# Patient Record
Sex: MALE | NOT HISPANIC OR LATINO | URBAN - METROPOLITAN AREA
[De-identification: names, ages, dates, MRNs, and addresses within clinical notes are randomized per-mention and may not be internally consistent; named-entity substitution may affect disease eponyms.]

---

## 2023-07-14 ENCOUNTER — APPOINTMENT (OUTPATIENT)
Dept: CARDIOLOGY | Facility: MEDICAL CENTER | Age: 48
End: 2023-07-14
Attending: HOSPITALIST
Payer: COMMERCIAL

## 2023-07-14 ENCOUNTER — HOSPITAL ENCOUNTER (OUTPATIENT)
Facility: MEDICAL CENTER | Age: 48
End: 2023-07-16
Attending: EMERGENCY MEDICINE | Admitting: HOSPITALIST
Payer: COMMERCIAL

## 2023-07-14 ENCOUNTER — APPOINTMENT (OUTPATIENT)
Dept: RADIOLOGY | Facility: MEDICAL CENTER | Age: 48
End: 2023-07-14
Attending: EMERGENCY MEDICINE
Payer: COMMERCIAL

## 2023-07-14 DIAGNOSIS — R07.89 ATYPICAL CHEST PAIN: ICD-10-CM

## 2023-07-14 PROBLEM — I16.0 HYPERTENSIVE URGENCY: Status: ACTIVE | Noted: 2023-07-14

## 2023-07-14 PROBLEM — I49.9 ARRHYTHMIA: Status: ACTIVE | Noted: 2023-07-14

## 2023-07-14 PROBLEM — E78.2 MIXED HYPERLIPIDEMIA: Status: ACTIVE | Noted: 2023-07-14

## 2023-07-14 PROBLEM — I50.22 CHRONIC SYSTOLIC HEART FAILURE (HCC): Status: ACTIVE | Noted: 2023-07-14

## 2023-07-14 PROBLEM — Z86.79 HISTORY OF VENTRICULAR TACHYCARDIA: Status: ACTIVE | Noted: 2023-07-14

## 2023-07-14 PROBLEM — R07.9 CHEST PAIN: Status: ACTIVE | Noted: 2023-07-14

## 2023-07-14 LAB
ALBUMIN SERPL BCP-MCNC: 3.6 G/DL (ref 3.2–4.9)
ALBUMIN/GLOB SERPL: 2 G/DL
ALP SERPL-CCNC: 99 U/L (ref 30–99)
ALT SERPL-CCNC: 15 U/L (ref 2–50)
ANION GAP SERPL CALC-SCNC: 6 MMOL/L (ref 7–16)
AST SERPL-CCNC: 12 U/L (ref 12–45)
BASOPHILS # BLD AUTO: 0.5 % (ref 0–1.8)
BASOPHILS # BLD: 0.03 K/UL (ref 0–0.12)
BILIRUB SERPL-MCNC: 0.6 MG/DL (ref 0.1–1.5)
BUN SERPL-MCNC: 14 MG/DL (ref 8–22)
CALCIUM ALBUM COR SERPL-MCNC: 7.9 MG/DL (ref 8.5–10.5)
CALCIUM SERPL-MCNC: 7.6 MG/DL (ref 8.5–10.5)
CHLORIDE SERPL-SCNC: 105 MMOL/L (ref 96–112)
CO2 SERPL-SCNC: 26 MMOL/L (ref 20–33)
CREAT SERPL-MCNC: 1.01 MG/DL (ref 0.5–1.4)
EKG IMPRESSION: NORMAL
EOSINOPHIL # BLD AUTO: 0.11 K/UL (ref 0–0.51)
EOSINOPHIL NFR BLD: 2 % (ref 0–6.9)
ERYTHROCYTE [DISTWIDTH] IN BLOOD BY AUTOMATED COUNT: 43.8 FL (ref 35.9–50)
FLUAV RNA SPEC QL NAA+PROBE: NEGATIVE
FLUBV RNA SPEC QL NAA+PROBE: NEGATIVE
GFR SERPLBLD CREATININE-BSD FMLA CKD-EPI: 91 ML/MIN/1.73 M 2
GLOBULIN SER CALC-MCNC: 1.8 G/DL (ref 1.9–3.5)
GLUCOSE SERPL-MCNC: 104 MG/DL (ref 65–99)
HCT VFR BLD AUTO: 39.1 % (ref 42–52)
HGB BLD-MCNC: 13.3 G/DL (ref 14–18)
IMM GRANULOCYTES # BLD AUTO: 0.02 K/UL (ref 0–0.11)
IMM GRANULOCYTES NFR BLD AUTO: 0.4 % (ref 0–0.9)
LV EJECT FRACT  99904: 40
LV EJECT FRACT MOD 2C 99903: 39.29
LV EJECT FRACT MOD 4C 99902: 39.42
LV EJECT FRACT MOD BP 99901: 40.44
LYMPHOCYTES # BLD AUTO: 0.92 K/UL (ref 1–4.8)
LYMPHOCYTES NFR BLD: 16.7 % (ref 22–41)
MCH RBC QN AUTO: 31.4 PG (ref 27–33)
MCHC RBC AUTO-ENTMCNC: 34 G/DL (ref 32.3–36.5)
MCV RBC AUTO: 92.4 FL (ref 81.4–97.8)
MONOCYTES # BLD AUTO: 0.58 K/UL (ref 0–0.85)
MONOCYTES NFR BLD AUTO: 10.5 % (ref 0–13.4)
NEUTROPHILS # BLD AUTO: 3.84 K/UL (ref 1.82–7.42)
NEUTROPHILS NFR BLD: 69.9 % (ref 44–72)
NRBC # BLD AUTO: 0 K/UL
NRBC BLD-RTO: 0 /100 WBC (ref 0–0.2)
NT-PROBNP SERPL IA-MCNC: 568 PG/ML (ref 0–125)
PLATELET # BLD AUTO: 106 K/UL (ref 164–446)
PMV BLD AUTO: 11.3 FL (ref 9–12.9)
POTASSIUM SERPL-SCNC: 3.7 MMOL/L (ref 3.6–5.5)
PROT SERPL-MCNC: 5.4 G/DL (ref 6–8.2)
RBC # BLD AUTO: 4.23 M/UL (ref 4.7–6.1)
RSV RNA SPEC QL NAA+PROBE: NEGATIVE
SARS-COV-2 RNA RESP QL NAA+PROBE: NOTDETECTED
SODIUM SERPL-SCNC: 137 MMOL/L (ref 135–145)
SPECIMEN SOURCE: NORMAL
TROPONIN T SERPL-MCNC: 9 NG/L (ref 6–19)
WBC # BLD AUTO: 5.5 K/UL (ref 4.8–10.8)

## 2023-07-14 PROCEDURE — 71045 X-RAY EXAM CHEST 1 VIEW: CPT

## 2023-07-14 PROCEDURE — 93306 TTE W/DOPPLER COMPLETE: CPT | Mod: 26 | Performed by: INTERNAL MEDICINE

## 2023-07-14 PROCEDURE — 36415 COLL VENOUS BLD VENIPUNCTURE: CPT

## 2023-07-14 PROCEDURE — G0378 HOSPITAL OBSERVATION PER HR: HCPCS

## 2023-07-14 PROCEDURE — A9270 NON-COVERED ITEM OR SERVICE: HCPCS | Performed by: NURSE PRACTITIONER

## 2023-07-14 PROCEDURE — 93005 ELECTROCARDIOGRAM TRACING: CPT

## 2023-07-14 PROCEDURE — 85025 COMPLETE CBC W/AUTO DIFF WBC: CPT

## 2023-07-14 PROCEDURE — 93005 ELECTROCARDIOGRAM TRACING: CPT | Performed by: EMERGENCY MEDICINE

## 2023-07-14 PROCEDURE — A9270 NON-COVERED ITEM OR SERVICE: HCPCS | Performed by: HOSPITALIST

## 2023-07-14 PROCEDURE — 99223 1ST HOSP IP/OBS HIGH 75: CPT | Performed by: HOSPITALIST

## 2023-07-14 PROCEDURE — 700111 HCHG RX REV CODE 636 W/ 250 OVERRIDE (IP): Performed by: HOSPITALIST

## 2023-07-14 PROCEDURE — 700102 HCHG RX REV CODE 250 W/ 637 OVERRIDE(OP): Performed by: NURSE PRACTITIONER

## 2023-07-14 PROCEDURE — 0241U HCHG SARS-COV-2 COVID-19 NFCT DS RESP RNA 4 TRGT MIC: CPT

## 2023-07-14 PROCEDURE — 80053 COMPREHEN METABOLIC PANEL: CPT

## 2023-07-14 PROCEDURE — 96375 TX/PRO/DX INJ NEW DRUG ADDON: CPT

## 2023-07-14 PROCEDURE — 99285 EMERGENCY DEPT VISIT HI MDM: CPT

## 2023-07-14 PROCEDURE — 93306 TTE W/DOPPLER COMPLETE: CPT

## 2023-07-14 PROCEDURE — C9803 HOPD COVID-19 SPEC COLLECT: HCPCS | Performed by: EMERGENCY MEDICINE

## 2023-07-14 PROCEDURE — 83880 ASSAY OF NATRIURETIC PEPTIDE: CPT

## 2023-07-14 PROCEDURE — 84484 ASSAY OF TROPONIN QUANT: CPT

## 2023-07-14 PROCEDURE — 700102 HCHG RX REV CODE 250 W/ 637 OVERRIDE(OP): Performed by: HOSPITALIST

## 2023-07-14 RX ORDER — CARVEDILOL 6.25 MG/1
6.25 TABLET ORAL 2 TIMES DAILY WITH MEALS
Status: DISCONTINUED | OUTPATIENT
Start: 2023-07-14 | End: 2023-07-16 | Stop reason: HOSPADM

## 2023-07-14 RX ORDER — ENOXAPARIN SODIUM 100 MG/ML
40 INJECTION SUBCUTANEOUS DAILY
Status: DISCONTINUED | OUTPATIENT
Start: 2023-07-14 | End: 2023-07-16 | Stop reason: HOSPADM

## 2023-07-14 RX ORDER — LISINOPRIL 20 MG/1
20 TABLET ORAL DAILY
COMMUNITY

## 2023-07-14 RX ORDER — MEXILETINE HYDROCHLORIDE 150 MG/1
150 CAPSULE ORAL 3 TIMES DAILY
COMMUNITY

## 2023-07-14 RX ORDER — ACETAMINOPHEN 500 MG
1000 TABLET ORAL EVERY 6 HOURS PRN
Status: DISCONTINUED | OUTPATIENT
Start: 2023-07-19 | End: 2023-07-16

## 2023-07-14 RX ORDER — LISINOPRIL 20 MG/1
20 TABLET ORAL ONCE
Status: DISCONTINUED | OUTPATIENT
Start: 2023-07-14 | End: 2023-07-15

## 2023-07-14 RX ORDER — ATORVASTATIN CALCIUM 20 MG/1
20 TABLET, FILM COATED ORAL
Status: DISCONTINUED | OUTPATIENT
Start: 2023-07-14 | End: 2023-07-16 | Stop reason: HOSPADM

## 2023-07-14 RX ORDER — LACTULOSE 20 G/30ML
30 SOLUTION ORAL EVERY EVENING
Status: DISCONTINUED | OUTPATIENT
Start: 2023-07-15 | End: 2023-07-16 | Stop reason: HOSPADM

## 2023-07-14 RX ORDER — HYDRALAZINE HYDROCHLORIDE 20 MG/ML
20 INJECTION INTRAMUSCULAR; INTRAVENOUS EVERY 6 HOURS PRN
Status: DISCONTINUED | OUTPATIENT
Start: 2023-07-14 | End: 2023-07-16 | Stop reason: HOSPADM

## 2023-07-14 RX ORDER — OXYCODONE HYDROCHLORIDE 10 MG/1
10 TABLET ORAL
Status: DISCONTINUED | OUTPATIENT
Start: 2023-07-14 | End: 2023-07-15

## 2023-07-14 RX ORDER — SPIRONOLACTONE 25 MG/1
25 TABLET ORAL DAILY
COMMUNITY

## 2023-07-14 RX ORDER — AMIODARONE HYDROCHLORIDE 200 MG/1
200 TABLET ORAL 2 TIMES DAILY
Status: DISCONTINUED | OUTPATIENT
Start: 2023-07-14 | End: 2023-07-16 | Stop reason: HOSPADM

## 2023-07-14 RX ORDER — MEXILETINE HYDROCHLORIDE 150 MG/1
150 CAPSULE ORAL EVERY 8 HOURS
Status: DISCONTINUED | OUTPATIENT
Start: 2023-07-14 | End: 2023-07-16 | Stop reason: HOSPADM

## 2023-07-14 RX ORDER — LISINOPRIL 20 MG/1
20 TABLET ORAL DAILY
Status: DISCONTINUED | OUTPATIENT
Start: 2023-07-15 | End: 2023-07-14

## 2023-07-14 RX ORDER — AMIODARONE HYDROCHLORIDE 200 MG/1
200 TABLET ORAL 2 TIMES DAILY
COMMUNITY

## 2023-07-14 RX ORDER — TAMSULOSIN HYDROCHLORIDE 0.4 MG/1
0.4 CAPSULE ORAL EVERY EVENING
Status: DISCONTINUED | OUTPATIENT
Start: 2023-07-14 | End: 2023-07-16 | Stop reason: HOSPADM

## 2023-07-14 RX ORDER — CALCIUM POLYCARBOPHIL 625 MG 625 MG/1
1250 TABLET ORAL DAILY
COMMUNITY

## 2023-07-14 RX ORDER — BISACODYL 10 MG
10 SUPPOSITORY, RECTAL RECTAL
Status: DISCONTINUED | OUTPATIENT
Start: 2023-07-14 | End: 2023-07-16 | Stop reason: HOSPADM

## 2023-07-14 RX ORDER — POLYETHYLENE GLYCOL 3350 17 G/17G
1 POWDER, FOR SOLUTION ORAL
Status: DISCONTINUED | OUTPATIENT
Start: 2023-07-14 | End: 2023-07-16 | Stop reason: HOSPADM

## 2023-07-14 RX ORDER — ATORVASTATIN CALCIUM 20 MG/1
20 TABLET, FILM COATED ORAL
COMMUNITY

## 2023-07-14 RX ORDER — LACTULOSE 10 G/15ML
20 SOLUTION ORAL 2 TIMES DAILY
COMMUNITY

## 2023-07-14 RX ORDER — TAMSULOSIN HYDROCHLORIDE 0.4 MG/1
0.4 CAPSULE ORAL EVERY EVENING
COMMUNITY

## 2023-07-14 RX ORDER — CARVEDILOL 6.25 MG/1
6.25 TABLET ORAL 2 TIMES DAILY
COMMUNITY

## 2023-07-14 RX ORDER — NITROGLYCERIN 0.4 MG/1
0.4 TABLET SUBLINGUAL
COMMUNITY

## 2023-07-14 RX ORDER — ACETAMINOPHEN 500 MG
1000 TABLET ORAL EVERY 6 HOURS
Status: DISCONTINUED | OUTPATIENT
Start: 2023-07-14 | End: 2023-07-16

## 2023-07-14 RX ORDER — IBUPROFEN 800 MG/1
800 TABLET ORAL 3 TIMES DAILY PRN
Status: DISCONTINUED | OUTPATIENT
Start: 2023-07-19 | End: 2023-07-14

## 2023-07-14 RX ORDER — FUROSEMIDE 20 MG/1
20 TABLET ORAL
COMMUNITY

## 2023-07-14 RX ORDER — ASPIRIN 81 MG/1
81 TABLET ORAL DAILY
COMMUNITY

## 2023-07-14 RX ORDER — LACTULOSE 20 G/30ML
30 SOLUTION ORAL 2 TIMES DAILY
Status: DISCONTINUED | OUTPATIENT
Start: 2023-07-14 | End: 2023-07-14

## 2023-07-14 RX ORDER — MORPHINE SULFATE 4 MG/ML
4 INJECTION INTRAVENOUS
Status: DISCONTINUED | OUTPATIENT
Start: 2023-07-14 | End: 2023-07-15

## 2023-07-14 RX ORDER — LISINOPRIL 20 MG/1
40 TABLET ORAL DAILY
Status: DISCONTINUED | OUTPATIENT
Start: 2023-07-15 | End: 2023-07-15

## 2023-07-14 RX ORDER — OXYCODONE HYDROCHLORIDE 5 MG/1
5 TABLET ORAL
Status: DISCONTINUED | OUTPATIENT
Start: 2023-07-14 | End: 2023-07-15

## 2023-07-14 RX ORDER — TRIAMCINOLONE ACETONIDE 55 UG/1
2 SPRAY, METERED NASAL DAILY
COMMUNITY

## 2023-07-14 RX ORDER — IBUPROFEN 800 MG/1
800 TABLET ORAL 3 TIMES DAILY
Status: DISCONTINUED | OUTPATIENT
Start: 2023-07-14 | End: 2023-07-16

## 2023-07-14 RX ORDER — SPIRONOLACTONE 25 MG/1
25 TABLET ORAL DAILY
Status: DISCONTINUED | OUTPATIENT
Start: 2023-07-15 | End: 2023-07-15

## 2023-07-14 RX ORDER — AMOXICILLIN 250 MG
2 CAPSULE ORAL 2 TIMES DAILY
Status: DISCONTINUED | OUTPATIENT
Start: 2023-07-14 | End: 2023-07-16 | Stop reason: HOSPADM

## 2023-07-14 RX ADMIN — AMIODARONE HYDROCHLORIDE 200 MG: 200 TABLET ORAL at 17:04

## 2023-07-14 RX ADMIN — MEXILETINE HYDROCHLORIDE 150 MG: 150 CAPSULE ORAL at 17:04

## 2023-07-14 RX ADMIN — CARVEDILOL 6.25 MG: 6.25 TABLET, FILM COATED ORAL at 17:04

## 2023-07-14 RX ADMIN — OXYCODONE HYDROCHLORIDE 10 MG: 10 TABLET ORAL at 20:17

## 2023-07-14 RX ADMIN — ACETAMINOPHEN 1000 MG: 500 TABLET, FILM COATED ORAL at 20:18

## 2023-07-14 RX ADMIN — TAMSULOSIN HYDROCHLORIDE 0.4 MG: 0.4 CAPSULE ORAL at 17:04

## 2023-07-14 RX ADMIN — HYDRALAZINE HYDROCHLORIDE 20 MG: 20 INJECTION, SOLUTION INTRAMUSCULAR; INTRAVENOUS at 20:14

## 2023-07-14 RX ADMIN — IBUPROFEN 800 MG: 800 TABLET, FILM COATED ORAL at 20:16

## 2023-07-14 RX ADMIN — ATORVASTATIN CALCIUM 20 MG: 20 TABLET, FILM COATED ORAL at 20:17

## 2023-07-14 ASSESSMENT — LIFESTYLE VARIABLES
TOTAL SCORE: 0
ON A TYPICAL DAY WHEN YOU DRINK ALCOHOL HOW MANY DRINKS DO YOU HAVE: 0
TOTAL SCORE: 0
AVERAGE NUMBER OF DAYS PER WEEK YOU HAVE A DRINK CONTAINING ALCOHOL: 0
CONSUMPTION TOTAL: NEGATIVE
EVER HAD A DRINK FIRST THING IN THE MORNING TO STEADY YOUR NERVES TO GET RID OF A HANGOVER: NO
HAVE PEOPLE ANNOYED YOU BY CRITICIZING YOUR DRINKING: NO
EVER FELT BAD OR GUILTY ABOUT YOUR DRINKING: NO
DOES PATIENT WANT TO STOP DRINKING: NO
ALCOHOL_USE: NO
TOTAL SCORE: 0
HOW MANY TIMES IN THE PAST YEAR HAVE YOU HAD 5 OR MORE DRINKS IN A DAY: 0
HAVE YOU EVER FELT YOU SHOULD CUT DOWN ON YOUR DRINKING: NO

## 2023-07-14 ASSESSMENT — PATIENT HEALTH QUESTIONNAIRE - PHQ9
SUM OF ALL RESPONSES TO PHQ9 QUESTIONS 1 AND 2: 0
2. FEELING DOWN, DEPRESSED, IRRITABLE, OR HOPELESS: NOT AT ALL
1. LITTLE INTEREST OR PLEASURE IN DOING THINGS: NOT AT ALL

## 2023-07-14 ASSESSMENT — ENCOUNTER SYMPTOMS
CHILLS: 0
RESPIRATORY NEGATIVE: 1
WEIGHT LOSS: 0
ABDOMINAL PAIN: 0
NAUSEA: 0
HEARTBURN: 0
MUSCULOSKELETAL NEGATIVE: 1
VOMITING: 0
NEUROLOGICAL NEGATIVE: 1
FEVER: 0
EYES NEGATIVE: 1
PSYCHIATRIC NEGATIVE: 1

## 2023-07-14 ASSESSMENT — PAIN DESCRIPTION - PAIN TYPE: TYPE: ACUTE PAIN

## 2023-07-14 ASSESSMENT — FIBROSIS 4 INDEX: FIB4 SCORE: 1.43

## 2023-07-14 NOTE — ED NOTES
Pharmacy Medication Reconciliation      ~Medication reconciliation updated and complete per med list from [facility]

## 2023-07-14 NOTE — ED PROVIDER NOTES
ED Provider Note    CHIEF COMPLAINT  Chief Complaint   Patient presents with    Chest Pain     Pt states he had a sudden onset of crushing and shooting chest pain that lasted approx 5 seconds. Pt states it is substernal, and has happened 2x times before followed by his defibrillator going off. Today the defibrillator did not go off.        EXTERNAL RECORDS REVIEWED  Records from [facility]    HPI/ROS    OUTSIDE HISTORIAN(S):  Transfer call from Dr. Gardner    Phil Browne is a 48 y.o. male who presents transferred from [facility] facility with chest pain.  Patient reports he has been having chest pain for around the last week this been coming and going.  He reports it occurs once every few days.  He reports it comes on suddenly and then resolves without intervention.  He reports that last for around 5 seconds.  Patient has an extensive cardiac history including cardiomyopathy and heart failure with an ejection fraction of approximately 20%.  Patient with recent echo done by [facility] within the last 2 days which showed improving ejection fraction from his baseline and no new concerning findings.  Patient reports that last night he started feeling unwell, he reports he was diaphoretic, sweating through his close, and had some chest discomfort.  He reports associated nausea.  He reports that he also had a brief episode of chest pain as well that lasted for 5 seconds and then resolved.  Patient reports that he received some nitroglycerin at which point his symptoms improved.  He denies any associated fevers.  Denies any associated cough.  Patient denies any associated abdominal pain.  Given patient's symptoms the [facility] physician was concerned and therefore sent him to our facility.  Per the transferring systems patient's last stress test was in 2021    PAST MEDICAL HISTORY       SURGICAL HISTORY  patient denies any surgical history    FAMILY HISTORY  No family history on  "file.    SOCIAL HISTORY  Social History     Tobacco Use    Smoking status: Not on file    Smokeless tobacco: Not on file   Substance and Sexual Activity    Alcohol use: Not on file    Drug use: Not on file    Sexual activity: Not on file       CURRENT MEDICATIONS  Home Medications    **Home medications have not yet been reviewed for this encounter**         ALLERGIES  Not on File    PHYSICAL EXAM  VITAL SIGNS: BP (!) 181/102   Pulse 60   Resp 16   Ht 1.854 m (6' 1\")   Wt 88.9 kg (196 lb)   SpO2 97%   BMI 25.86 kg/m²    Physical Exam  Constitutional:       Appearance: Normal appearance.   HENT:      Head: Normocephalic.      Right Ear: Tympanic membrane normal.      Left Ear: Tympanic membrane normal.      Nose: Nose normal.      Mouth/Throat:      Mouth: Mucous membranes are moist.   Eyes:      Extraocular Movements: Extraocular movements intact.      Pupils: Pupils are equal, round, and reactive to light.   Cardiovascular:      Rate and Rhythm: Normal rate and regular rhythm.   Pulmonary:      Effort: Pulmonary effort is normal. No respiratory distress.      Breath sounds: Normal breath sounds. No stridor. No wheezing or rales.   Chest:      Chest wall: No tenderness.   Abdominal:      General: Abdomen is flat. There is no distension or abdominal bruit.      Palpations: Abdomen is soft. There is no mass.      Tenderness: There is no abdominal tenderness.   Musculoskeletal:      Cervical back: Normal range of motion.   Skin:     General: Skin is warm.      Capillary Refill: Capillary refill takes less than 2 seconds.   Neurological:      General: No focal deficit present.      Mental Status: He is alert and oriented to person, place, and time.   Psychiatric:         Mood and Affect: Mood normal.           DIAGNOSTIC STUDIES / PROCEDURES  EKG  I have independently interpreted this EKG  See below    LABS  Results for orders placed or performed during the hospital encounter of 07/14/23   CBC with Differential "   Result Value Ref Range    WBC 5.5 4.8 - 10.8 K/uL    RBC 4.23 (L) 4.70 - 6.10 M/uL    Hemoglobin 13.3 (L) 14.0 - 18.0 g/dL    Hematocrit 39.1 (L) 42.0 - 52.0 %    MCV 92.4 81.4 - 97.8 fL    MCH 31.4 27.0 - 33.0 pg    MCHC 34.0 32.3 - 36.5 g/dL    RDW 43.8 35.9 - 50.0 fL    Platelet Count 106 (L) 164 - 446 K/uL    MPV 11.3 9.0 - 12.9 fL    Neutrophils-Polys 69.90 44.00 - 72.00 %    Lymphocytes 16.70 (L) 22.00 - 41.00 %    Monocytes 10.50 0.00 - 13.40 %    Eosinophils 2.00 0.00 - 6.90 %    Basophils 0.50 0.00 - 1.80 %    Immature Granulocytes 0.40 0.00 - 0.90 %    Nucleated RBC 0.00 0.00 - 0.20 /100 WBC    Neutrophils (Absolute) 3.84 1.82 - 7.42 K/uL    Lymphs (Absolute) 0.92 (L) 1.00 - 4.80 K/uL    Monos (Absolute) 0.58 0.00 - 0.85 K/uL    Eos (Absolute) 0.11 0.00 - 0.51 K/uL    Baso (Absolute) 0.03 0.00 - 0.12 K/uL    Immature Granulocytes (abs) 0.02 0.00 - 0.11 K/uL    NRBC (Absolute) 0.00 K/uL   Complete Metabolic Panel (CMP)   Result Value Ref Range    Sodium 137 135 - 145 mmol/L    Potassium 3.7 3.6 - 5.5 mmol/L    Chloride 105 96 - 112 mmol/L    Co2 26 20 - 33 mmol/L    Anion Gap 6.0 (L) 7.0 - 16.0    Glucose 104 (H) 65 - 99 mg/dL    Bun 14 8 - 22 mg/dL    Creatinine 1.01 0.50 - 1.40 mg/dL    Calcium 7.6 (L) 8.5 - 10.5 mg/dL    AST(SGOT) 12 12 - 45 U/L    ALT(SGPT) 15 2 - 50 U/L    Alkaline Phosphatase 99 30 - 99 U/L    Total Bilirubin 0.6 0.1 - 1.5 mg/dL    Albumin 3.6 3.2 - 4.9 g/dL    Total Protein 5.4 (L) 6.0 - 8.2 g/dL    Globulin 1.8 (L) 1.9 - 3.5 g/dL    A-G Ratio 2.0 g/dL   Troponins NOW   Result Value Ref Range    Troponin T 9 6 - 19 ng/L   Troponins in two (2) hours   Result Value Ref Range    Troponin T 9 6 - 19 ng/L   CoV-2, FLU A/B, and RSV by PCR (2-4 Hours CEPHEID) : Collect NP swab in VTM    Specimen: Respirate   Result Value Ref Range    Influenza virus A RNA Negative Negative    Influenza virus B, PCR Negative Negative    RSV, PCR Negative Negative    SARS-CoV-2 by PCR NotDetected      SARS-CoV-2 Source NP Swab    ESTIMATED GFR   Result Value Ref Range    GFR (CKD-EPI) 91 >60 mL/min/1.73 m 2   CORRECTED CALCIUM   Result Value Ref Range    Correct Calcium 7.9 (L) 8.5 - 10.5 mg/dL   EKG   Result Value Ref Range    Report       [facility] Emergency Dept.    Test Date:  2023  [facility]             Department: ER  MRN:        [id number]                      Room:       RD 12  Gender:     Male                         Technician: [id number]  :        1975                   Requested By:ER TRIAGE PROTOCOL  Order #:    [id number]                    Reading MD: Marija Richards MD    Measurements  Intervals                                Axis  Rate:       60                           P:          71  ND:         159                          QRS:        81  QRSD:       156                          T:          -18  QT:         530  QTc:        530    Interpretive Statements  EKG is paced rhythm, Sgarbossa criteria negative  Electronically Signed On 2023 13:00:53 PDT by Marija Richards MD           RADIOLOGY  I have independently interpreted the diagnostic imaging associated with this visit and am waiting the final reading from the radiologist.   My preliminary interpretation is as follows: Unremarkable chest x-ray  Radiologist interpretation:   DX-CHEST-PORTABLE (1 VIEW)   Final Result         1. No acute cardiopulmonary abnormalities are identified.      EC-ECHOCARDIOGRAM COMPLETE W/O CONT    (Results Pending)         COURSE & MEDICAL DECISION MAKING      INITIAL ASSESSMENT, COURSE AND PLAN  Care Narrative: Very well-appearing patient with very reassuring exam.  He is not any associated lower extremity edema, he does not have any ongoing pain.  He is mildly hypertensive but other than that the rest of his symptoms are very reassuring.  Patient chest pain will be highly atypical of a pulm embolus or dissection given that the pain is coming and going and has been  going on for over a week.  The paroxysmal nature of this would be highly atypical of a fixed vascular lesion.  This could also be similar reasoning to suggest against ACS.  Certainly patient has a concerning medical history and he will need to be worked up.  We will check basic labs including troponin.  Patient without any associated abdominal pain to suggest surgical process as the cause of his symptoms.  We will check basic labs, EKG, troponin for further evaluation.  Chest x-ray is unremarkable.  Patient's EKG basic labs and troponin are all within normal limits.  As patient is greater than 3 hours outside of the onset of his symptoms and not believe a second troponin is necessary at this point.  Bedside ultrasound fails to reveal any evidence of cholelithiasis.  Given patient's history he will be admitted for stress test as it has been greater than 2 years since his last that he is having chest pain.  Is difficult to entirely clinically rule out patient given his history.  His heart score is greater than 3.  I discussed the case with Dr. Arroyo of the CDU has admitted for further risk stratification.        DISPOSITION AND DISCUSSIONS  I have discussed management of the patient with the following physicians and FERMIN's: Hospitalist, Dr. Arroyo    Decision tools and prescription drugs considered including, but not limited to: Heart score greater than 3    FINAL DIAGNOSIS  1. Atypical chest pain

## 2023-07-14 NOTE — ED TRIAGE NOTES
"Chief Complaint   Patient presents with    Chest Pain     Pt states he had a sudden onset of crushing and shooting chest pain that lasted approx 5 seconds. Pt states it is substernal, and has happened 2x times before followed by his defibrillator going off. Today the defibrillator did not go off.        Pt BIBA [facility]SA[facility] air with the above complaint. EKG completed at [facility] showed LBBB and some elevation, but compared to previous EKG showed no acute changes. Pt given 324 chewable asa and 1x 0.4 SL nitro with relief. SEMSA initiated nitro gtt for htn of 200 systolic.     BP (!) 181/102   Pulse 60   Resp 16   Ht 1.854 m (6' 1\")   Wt 88.9 kg (196 lb)   SpO2 97%   BMI 25.86 kg/m²       "

## 2023-07-15 ENCOUNTER — APPOINTMENT (OUTPATIENT)
Dept: RADIOLOGY | Facility: MEDICAL CENTER | Age: 48
End: 2023-07-15
Attending: HOSPITALIST
Payer: COMMERCIAL

## 2023-07-15 PROBLEM — R94.31 PROLONGED Q-T INTERVAL ON ECG: Status: ACTIVE | Noted: 2023-07-15

## 2023-07-15 LAB
ANION GAP SERPL CALC-SCNC: 8 MMOL/L (ref 7–16)
BUN SERPL-MCNC: 17 MG/DL (ref 8–22)
CALCIUM SERPL-MCNC: 8.2 MG/DL (ref 8.5–10.5)
CHLORIDE SERPL-SCNC: 103 MMOL/L (ref 96–112)
CO2 SERPL-SCNC: 25 MMOL/L (ref 20–33)
CREAT SERPL-MCNC: 1.26 MG/DL (ref 0.5–1.4)
GFR SERPLBLD CREATININE-BSD FMLA CKD-EPI: 70 ML/MIN/1.73 M 2
GLUCOSE SERPL-MCNC: 123 MG/DL (ref 65–99)
NT-PROBNP SERPL IA-MCNC: 531 PG/ML (ref 0–125)
POTASSIUM SERPL-SCNC: 4.2 MMOL/L (ref 3.6–5.5)
SODIUM SERPL-SCNC: 136 MMOL/L (ref 135–145)
TROPONIN T SERPL-MCNC: 10 NG/L (ref 6–19)

## 2023-07-15 PROCEDURE — 99233 SBSQ HOSP IP/OBS HIGH 50: CPT | Performed by: HOSPITALIST

## 2023-07-15 PROCEDURE — 93005 ELECTROCARDIOGRAM TRACING: CPT | Performed by: HOSPITALIST

## 2023-07-15 PROCEDURE — A9270 NON-COVERED ITEM OR SERVICE: HCPCS | Performed by: HOSPITALIST

## 2023-07-15 PROCEDURE — 700111 HCHG RX REV CODE 636 W/ 250 OVERRIDE (IP)

## 2023-07-15 PROCEDURE — 700111 HCHG RX REV CODE 636 W/ 250 OVERRIDE (IP): Performed by: HOSPITALIST

## 2023-07-15 PROCEDURE — 36415 COLL VENOUS BLD VENIPUNCTURE: CPT

## 2023-07-15 PROCEDURE — G0378 HOSPITAL OBSERVATION PER HR: HCPCS

## 2023-07-15 PROCEDURE — 700102 HCHG RX REV CODE 250 W/ 637 OVERRIDE(OP): Performed by: NURSE PRACTITIONER

## 2023-07-15 PROCEDURE — 78452 HT MUSCLE IMAGE SPECT MULT: CPT

## 2023-07-15 PROCEDURE — 83880 ASSAY OF NATRIURETIC PEPTIDE: CPT

## 2023-07-15 PROCEDURE — 700102 HCHG RX REV CODE 250 W/ 637 OVERRIDE(OP): Performed by: HOSPITALIST

## 2023-07-15 PROCEDURE — 80048 BASIC METABOLIC PNL TOTAL CA: CPT

## 2023-07-15 PROCEDURE — 96376 TX/PRO/DX INJ SAME DRUG ADON: CPT

## 2023-07-15 PROCEDURE — A9270 NON-COVERED ITEM OR SERVICE: HCPCS | Performed by: NURSE PRACTITIONER

## 2023-07-15 PROCEDURE — 96375 TX/PRO/DX INJ NEW DRUG ADDON: CPT

## 2023-07-15 PROCEDURE — 84484 ASSAY OF TROPONIN QUANT: CPT

## 2023-07-15 RX ORDER — REGADENOSON 0.08 MG/ML
INJECTION, SOLUTION INTRAVENOUS
Status: COMPLETED
Start: 2023-07-15 | End: 2023-07-15

## 2023-07-15 RX ORDER — SODIUM CHLORIDE 9 MG/ML
INJECTION, SOLUTION INTRAVENOUS CONTINUOUS
Status: DISCONTINUED | OUTPATIENT
Start: 2023-07-15 | End: 2023-07-15

## 2023-07-15 RX ORDER — METOCLOPRAMIDE HYDROCHLORIDE 5 MG/ML
10 INJECTION INTRAMUSCULAR; INTRAVENOUS ONCE
Status: COMPLETED | OUTPATIENT
Start: 2023-07-15 | End: 2023-07-15

## 2023-07-15 RX ORDER — KETOROLAC TROMETHAMINE 30 MG/ML
15 INJECTION, SOLUTION INTRAMUSCULAR; INTRAVENOUS EVERY 6 HOURS PRN
Status: DISCONTINUED | OUTPATIENT
Start: 2023-07-15 | End: 2023-07-16 | Stop reason: HOSPADM

## 2023-07-15 RX ORDER — LISINOPRIL 20 MG/1
20 TABLET ORAL DAILY
Status: DISCONTINUED | OUTPATIENT
Start: 2023-07-16 | End: 2023-07-16 | Stop reason: HOSPADM

## 2023-07-15 RX ORDER — ONDANSETRON 2 MG/ML
4 INJECTION INTRAMUSCULAR; INTRAVENOUS EVERY 4 HOURS PRN
Status: DISCONTINUED | OUTPATIENT
Start: 2023-07-15 | End: 2023-07-16 | Stop reason: HOSPADM

## 2023-07-15 RX ORDER — AMINOPHYLLINE 25 MG/ML
100 INJECTION, SOLUTION INTRAVENOUS
Status: DISCONTINUED | OUTPATIENT
Start: 2023-07-15 | End: 2023-07-16 | Stop reason: HOSPADM

## 2023-07-15 RX ORDER — REGADENOSON 0.08 MG/ML
0.4 INJECTION, SOLUTION INTRAVENOUS ONCE
Status: COMPLETED | OUTPATIENT
Start: 2023-07-15 | End: 2023-07-15

## 2023-07-15 RX ADMIN — REGADENOSON 0.4 MG: 0.08 INJECTION, SOLUTION INTRAVENOUS at 10:17

## 2023-07-15 RX ADMIN — IBUPROFEN 800 MG: 800 TABLET, FILM COATED ORAL at 18:19

## 2023-07-15 RX ADMIN — IBUPROFEN 800 MG: 800 TABLET, FILM COATED ORAL at 05:58

## 2023-07-15 RX ADMIN — ATORVASTATIN CALCIUM 20 MG: 20 TABLET, FILM COATED ORAL at 20:56

## 2023-07-15 RX ADMIN — LISINOPRIL 40 MG: 20 TABLET ORAL at 06:00

## 2023-07-15 RX ADMIN — MEXILETINE HYDROCHLORIDE 150 MG: 150 CAPSULE ORAL at 00:08

## 2023-07-15 RX ADMIN — KETOROLAC TROMETHAMINE 15 MG: 30 INJECTION, SOLUTION INTRAMUSCULAR; INTRAVENOUS at 08:09

## 2023-07-15 RX ADMIN — IBUPROFEN 800 MG: 800 TABLET, FILM COATED ORAL at 13:36

## 2023-07-15 RX ADMIN — METOCLOPRAMIDE 10 MG: 5 INJECTION, SOLUTION INTRAMUSCULAR; INTRAVENOUS at 09:08

## 2023-07-15 RX ADMIN — ACETAMINOPHEN 1000 MG: 500 TABLET, FILM COATED ORAL at 00:08

## 2023-07-15 RX ADMIN — TAMSULOSIN HYDROCHLORIDE 0.4 MG: 0.4 CAPSULE ORAL at 18:20

## 2023-07-15 RX ADMIN — AMIODARONE HYDROCHLORIDE 200 MG: 200 TABLET ORAL at 06:03

## 2023-07-15 RX ADMIN — CARVEDILOL 6.25 MG: 6.25 TABLET, FILM COATED ORAL at 20:56

## 2023-07-15 RX ADMIN — ACETAMINOPHEN 1000 MG: 500 TABLET, FILM COATED ORAL at 13:36

## 2023-07-15 RX ADMIN — MEXILETINE HYDROCHLORIDE 150 MG: 150 CAPSULE ORAL at 13:36

## 2023-07-15 RX ADMIN — AMIODARONE HYDROCHLORIDE 200 MG: 200 TABLET ORAL at 18:19

## 2023-07-15 RX ADMIN — MEXILETINE HYDROCHLORIDE 150 MG: 150 CAPSULE ORAL at 22:59

## 2023-07-15 RX ADMIN — MEXILETINE HYDROCHLORIDE 150 MG: 150 CAPSULE ORAL at 06:03

## 2023-07-15 RX ADMIN — ACETAMINOPHEN 1000 MG: 500 TABLET, FILM COATED ORAL at 05:57

## 2023-07-15 RX ADMIN — HYDRALAZINE HYDROCHLORIDE 20 MG: 20 INJECTION, SOLUTION INTRAMUSCULAR; INTRAVENOUS at 08:37

## 2023-07-15 ASSESSMENT — ENCOUNTER SYMPTOMS
PSYCHIATRIC NEGATIVE: 1
SHORTNESS OF BREATH: 0
EYES NEGATIVE: 1
VOMITING: 0
NAUSEA: 0
ABDOMINAL PAIN: 0
WEAKNESS: 1
SEIZURES: 0
MUSCULOSKELETAL NEGATIVE: 1

## 2023-07-15 ASSESSMENT — PAIN DESCRIPTION - PAIN TYPE: TYPE: ACUTE PAIN

## 2023-07-15 NOTE — H&P
Hospital Medicine History & Physical Note    Date of Service  7/14/2023    Primary Care Physician  No primary care provider on file.    Consultants      Code Status  Full Code    Chief Complaint  Chief Complaint   Patient presents with    Chest Pain     Pt states he had a sudden onset of crushing and shooting chest pain that lasted approx 5 seconds. Pt states it is substernal, and has happened 2x times before followed by his defibrillator going off. Today the defibrillator did not go off.        History of Presenting Illness  Phil Browne is a 48 y.o. male who presented 7/14/2023 with a prisoner with a past medical history of chronic systolic heart failure with a EF of 40%, pacemaker, arrhythmia, hypertension, dyslipidemia presents with complaints of chest pain.  Apparently patient has normally chronic chest pain but the pain would last day has been different and worse.  He describes it as a sharp pain in the left side of his chest intensity 7 out of 10 intermittent with no radiation.  The pain did improve with nitroglycerin patient was noted to have markedly elevated blood pressure patient was transferred from the outlying facility because they were concerned that patient may have a STEMI.  Upon arrival the EKG shows no evidence of a STEMI.  Patient referred to me to rule for possible myocardial ischemia time: Patient's blood pressure    There is nausea but no vomiting    No shortness of breath        I discussed the plan of care with patient.    Review of Systems  Review of Systems   Constitutional:  Negative for chills, fever and weight loss.   HENT: Negative.     Eyes: Negative.    Respiratory: Negative.     Cardiovascular:  Positive for chest pain. Negative for leg swelling.   Gastrointestinal:  Negative for abdominal pain, heartburn, nausea and vomiting.   Genitourinary: Negative.    Musculoskeletal: Negative.    Skin: Negative.    Neurological: Negative.    Psychiatric/Behavioral: Negative.     All other  systems reviewed and are negative.      Past Medical History   has a past medical history of Arrhythmia, Congestive cardiac failure (HCC), Hyperlipidemia, and Hypertension.    Surgical History   has a past surgical history that includes tonsillectomy; pacemaker insertion (11/01/2013); and pacemaker revision (03/01/2022).     Family History  family history includes Anemia in his father; Cancer in his father; Diabetes in his father; Heart Failure in his father.   Family history reviewed with patient. There is no family history that is pertinent to the chief complaint.     Social History   reports that he quit smoking about 9 years ago. His smoking use included cigarettes. He started smoking about 27 years ago. He smoked an average of .25 packs per day. He has never used smokeless tobacco. He reports that he does not currently use alcohol. He reports that he does not currently use drugs.    Allergies  No Known Allergies    Medications  Prior to Admission Medications   Prescriptions Last Dose Informant Patient Reported? Taking?   Cholecalciferol 2000 UNIT Tab [facility] Yes Yes   Sig: Take 2,000 Units by mouth every day.   Colloidal Oatmeal 2 % Cream [facility] Yes Yes   Sig: Apply 1 Application topically 1 time a day as needed (ECZEMA).   Non Formulary Request [facility] Yes Yes   Sig: Apply 1 Application topically every day. Mineral oil-Lanolin Lotion   amiodarone (CORDARONE) 200 MG Tab [facility] Yes Yes   Sig: Take 200 mg by mouth 2 times a day.   aspirin 81 MG EC tablet [facility] Yes Yes   Sig: Take 81 mg by mouth every day.   atorvastatin (LIPITOR) 20 MG Tab [facility] Yes Yes   Sig: Take 20 mg by mouth at bedtime.   calcium polycarbophil (FIBERCON) 625 MG Tab [facility] Yes Yes   Sig: Take 1,250 mg by mouth every day.   carvedilol (COREG) 6.25 MG Tab [facility] Yes Yes   Sig: Take 6.25 mg by  mouth 2 times a day.   furosemide (LASIX) 20 MG Tab [facility] Yes Yes   Sig: Take 20 mg by mouth 1 time a day as needed (EDEMA).   lactulose 10 GM/15ML Solution [facility] Yes Yes   Sig: Take 20 g by mouth 2 times a day.   lisinopril (PRINIVIL) 20 MG Tab [facility] Yes Yes   Sig: Take 20 mg by mouth every day.   mexiletine (MEXITIL) 150 MG Cap [facility] Yes Yes   Sig: Take 150 mg by mouth 3 times a day.   nitroglycerin (NITROSTAT) 0.4 MG SL Tab [facility] Yes Yes   Sig: Place 0.4 mg under the tongue every 5 minutes as needed for Chest Pain.   sodium chloride (OCEAN) 0.65 % nasal spray [facility] Yes Yes   Sig: Administer 2 Sprays into affected nostril(S) 1 time a day as needed (NASAL CONGESTION).   spironolactone (ALDACTONE) 25 MG Tab [facility] Yes Yes   Sig: Take 25 mg by mouth every day.   tamsulosin (FLOMAX) 0.4 MG capsule [facility] Yes Yes   Sig: Take 0.4 mg by mouth every evening.   triamcinolone (NASACORT ALLERGY 24HR CHILDREN) 55 MCG/ACT nasal inhaler [facility] Yes Yes   Sig: Administer 2 Sprays into affected nostril(S) every day.      Facility-Administered Medications: None       Physical Exam  Temp:  [37.2 °C (98.9 °F)] 37.2 °C (98.9 °F)  Pulse:  [60-74] 74  Resp:  [12-18] 18  BP: (129-187)/() 187/114  SpO2:  [92 %-97 %] 94 %  Blood Pressure: (!) 187/114 (RN Notified)   Temperature: 37.2 °C (98.9 °F)   Pulse: 74   Respiration: 18   Pulse Oximetry: 94 %       Physical Exam  Constitutional:       General: He is not in acute distress.     Appearance: He is not ill-appearing, toxic-appearing or diaphoretic.   HENT:      Head: Normocephalic and atraumatic.      Nose: Nose normal.      Mouth/Throat:      Mouth: Mucous membranes are moist.   Eyes:      General: No scleral icterus.     Extraocular Movements: Extraocular movements intact.      Pupils: Pupils are equal,  round, and reactive to light.   Cardiovascular:      Rate and Rhythm: Normal rate and regular rhythm.      Heart sounds: No murmur heard.     No gallop.      Comments: Left chest pacemaker- left upper chest  Pulmonary:      Effort: No respiratory distress.      Breath sounds: Normal breath sounds. No wheezing, rhonchi or rales.   Abdominal:      General: There is no distension.      Palpations: There is no mass.      Tenderness: There is no abdominal tenderness. There is no guarding.      Hernia: No hernia is present.   Musculoskeletal:         General: No swelling, tenderness or deformity. Normal range of motion.      Cervical back: Normal range of motion and neck supple. No rigidity.      Right lower leg: No edema.      Left lower leg: No edema.   Lymphadenopathy:      Cervical: No cervical adenopathy.   Skin:     General: Skin is warm.      Capillary Refill: Capillary refill takes 2 to 3 seconds.      Findings: No lesion or rash.   Neurological:      General: No focal deficit present.      Mental Status: He is alert and oriented to person, place, and time.      Cranial Nerves: No cranial nerve deficit.      Motor: No weakness.      Gait: Gait normal.   Psychiatric:         Mood and Affect: Mood normal.         Laboratory:  Recent Labs     07/14/23  1030   WBC 5.5   RBC 4.23*   HEMOGLOBIN 13.3*   HEMATOCRIT 39.1*   MCV 92.4   MCH 31.4   MCHC 34.0   RDW 43.8   PLATELETCT 106*   MPV 11.3     Recent Labs     07/14/23  1030   SODIUM 137   POTASSIUM 3.7   CHLORIDE 105   CO2 26   GLUCOSE 104*   BUN 14   CREATININE 1.01   CALCIUM 7.6*     Recent Labs     07/14/23  1030   ALTSGPT 15   ASTSGOT 12   ALKPHOSPHAT 99   TBILIRUBIN 0.6   GLUCOSE 104*         No results for input(s): NTPROBNP in the last 72 hours.      Recent Labs     07/14/23  1030 07/14/23  1250 07/14/23  1938   TROPONINT 9 9 9       Imaging:  EC-ECHOCARDIOGRAM COMPLETE W/O CONT   Final Result      DX-CHEST-PORTABLE (1 VIEW)   Final Result         1. No acute  cardiopulmonary abnormalities are identified.      NM-CARDIAC STRESS TEST    (Results Pending)       EKG:  I have personally reviewed the images and compared with prior images.    Reviewed by me- nsr rate 60, no st or t wave chnages    Assessment/Plan:  Justification for Admission Status  I anticipate this patient is appropriate for observation status at this time because I expect patient require less than 40 hours for for evaluation of his chest pain and management of his uncontrolled hypertension      * Chest pain- (present on admission)  Assessment & Plan  The ASCVD Risk score (Rukhsana MILAN, et al., 2019) failed to calculate for the following reasons:    Cannot find a previous HDL lab    Cannot find a previous total cholesterol lab    Unable to determine if patient is Non-      Chest pain is most likely related to uncontrolled hypertension    Rule out for ACS with serial troponins    Check a Persantine thallium stress test in a.m.      Hypertensive urgency- (present on admission)  Assessment & Plan  We have resumed patient's home medications and we have increased patient's lisinopril to 40 mg p.o. daily    Hydralazine IV as needed for SBP greater than 155 systolic    History of ventricular tachycardia- (present on admission)  Assessment & Plan  Continue amiodarone and Mexitil    Monitor on telemetry    Chronic systolic heart failure (HCC)- (present on admission)  Assessment & Plan  Continue monitor I's and O's    Daily weights    Continue ACE inhibitor and beta-blocker    Continue Aldactone for diuresis    Follow BnP and clinical volume status to see whether or not we need to initiate IV Lasix        VTE prophylaxis: SCDs/TEDs

## 2023-07-15 NOTE — ASSESSMENT & PLAN NOTE
Lisinopril changed back to 20 minutes p.o. daily due to patient's hypotension on 7/15/2023    Hydralazine IV as needed for SBP greater than 155 systolic

## 2023-07-15 NOTE — ASSESSMENT & PLAN NOTE
Continue monitor I's and O's    Daily weights    Continue ACE inhibitor and beta-blocker    Continue Aldactone for diuresis    Follow BnP and clinical volume status to see whether or not we need to initiate IV Lasix

## 2023-07-15 NOTE — PROGRESS NOTES
4 Eyes Skin Assessment Completed by SOTERO Hernandez and JIMMIE Fournier.    Head WDL  Ears WDL  Nose WDL  Mouth WDL  Neck WDL  Breast/Chest Scar form pacemaker insertion  Shoulder Blades WDL  Spine WDL  (R) Arm/Elbow/Hand WDL  (L) Arm/Elbow/Hand WDL  Abdomen WDL  Groin WDL  Scrotum/Coccyx/Buttocks WDL  (R) Leg WDL  (L) Leg WDL  (R) Heel/Foot/Toe WDL  (L) Heel/Foot/Toe WDL          Devices In Places Tele Box, Blood Pressure Cuff, and Pulse Ox      Interventions In Place N/A    Possible Skin Injury No    Pictures Uploaded Into Epic N/A  Wound Consult Placed N/A  RN Wound Prevention Protocol Ordered No

## 2023-07-15 NOTE — ASSESSMENT & PLAN NOTE
The ASCVD Risk score (Rukhsana MILAN, et al., 2019) failed to calculate for the following reasons:    Cannot find a previous HDL lab    Cannot find a previous total cholesterol lab    Unable to determine if patient is Non-      Chest pain is most likely related to uncontrolled hypertension    Negative Persantine thallium stress test.

## 2023-07-16 VITALS
HEART RATE: 62 BPM | SYSTOLIC BLOOD PRESSURE: 140 MMHG | BODY MASS INDEX: 25.33 KG/M2 | WEIGHT: 191.14 LBS | TEMPERATURE: 97.2 F | RESPIRATION RATE: 16 BRPM | OXYGEN SATURATION: 96 % | DIASTOLIC BLOOD PRESSURE: 88 MMHG | HEIGHT: 73 IN

## 2023-07-16 PROBLEM — I16.0 HYPERTENSIVE URGENCY: Status: RESOLVED | Noted: 2023-07-14 | Resolved: 2023-07-16

## 2023-07-16 LAB
ANION GAP SERPL CALC-SCNC: 8 MMOL/L (ref 7–16)
APPEARANCE UR: CLEAR
BILIRUB UR QL STRIP.AUTO: NEGATIVE
BUN SERPL-MCNC: 23 MG/DL (ref 8–22)
CALCIUM SERPL-MCNC: 8.5 MG/DL (ref 8.5–10.5)
CHLORIDE SERPL-SCNC: 104 MMOL/L (ref 96–112)
CO2 SERPL-SCNC: 25 MMOL/L (ref 20–33)
COLOR UR: YELLOW
CREAT SERPL-MCNC: 1.61 MG/DL (ref 0.5–1.4)
EKG IMPRESSION: NORMAL
GFR SERPLBLD CREATININE-BSD FMLA CKD-EPI: 52 ML/MIN/1.73 M 2
GLUCOSE SERPL-MCNC: 87 MG/DL (ref 65–99)
GLUCOSE UR STRIP.AUTO-MCNC: NEGATIVE MG/DL
KETONES UR STRIP.AUTO-MCNC: ABNORMAL MG/DL
LEUKOCYTE ESTERASE UR QL STRIP.AUTO: NEGATIVE
MICRO URNS: ABNORMAL
NITRITE UR QL STRIP.AUTO: NEGATIVE
PH UR STRIP.AUTO: 5.5 [PH] (ref 5–8)
POTASSIUM SERPL-SCNC: 4.2 MMOL/L (ref 3.6–5.5)
PROT UR QL STRIP: NEGATIVE MG/DL
RBC UR QL AUTO: NEGATIVE
SODIUM SERPL-SCNC: 137 MMOL/L (ref 135–145)
SP GR UR STRIP.AUTO: 1.03
UROBILINOGEN UR STRIP.AUTO-MCNC: 1 MG/DL

## 2023-07-16 PROCEDURE — A9270 NON-COVERED ITEM OR SERVICE: HCPCS | Performed by: HOSPITALIST

## 2023-07-16 PROCEDURE — 99239 HOSP IP/OBS DSCHRG MGMT >30: CPT | Performed by: HOSPITALIST

## 2023-07-16 PROCEDURE — A9270 NON-COVERED ITEM OR SERVICE: HCPCS | Performed by: NURSE PRACTITIONER

## 2023-07-16 PROCEDURE — 80048 BASIC METABOLIC PNL TOTAL CA: CPT

## 2023-07-16 PROCEDURE — 81003 URINALYSIS AUTO W/O SCOPE: CPT

## 2023-07-16 PROCEDURE — G0378 HOSPITAL OBSERVATION PER HR: HCPCS

## 2023-07-16 PROCEDURE — 700102 HCHG RX REV CODE 250 W/ 637 OVERRIDE(OP): Performed by: NURSE PRACTITIONER

## 2023-07-16 PROCEDURE — 700102 HCHG RX REV CODE 250 W/ 637 OVERRIDE(OP): Performed by: HOSPITALIST

## 2023-07-16 PROCEDURE — 93010 ELECTROCARDIOGRAM REPORT: CPT | Performed by: INTERNAL MEDICINE

## 2023-07-16 RX ORDER — ACETAMINOPHEN 325 MG/1
650 TABLET ORAL ONCE
Status: COMPLETED | OUTPATIENT
Start: 2023-07-16 | End: 2023-07-16

## 2023-07-16 RX ADMIN — MEXILETINE HYDROCHLORIDE 150 MG: 150 CAPSULE ORAL at 14:37

## 2023-07-16 RX ADMIN — MEXILETINE HYDROCHLORIDE 150 MG: 150 CAPSULE ORAL at 05:13

## 2023-07-16 RX ADMIN — LISINOPRIL 20 MG: 20 TABLET ORAL at 05:13

## 2023-07-16 RX ADMIN — ACETAMINOPHEN 1000 MG: 500 TABLET, FILM COATED ORAL at 05:16

## 2023-07-16 RX ADMIN — CARVEDILOL 6.25 MG: 6.25 TABLET, FILM COATED ORAL at 07:26

## 2023-07-16 RX ADMIN — AMIODARONE HYDROCHLORIDE 200 MG: 200 TABLET ORAL at 05:13

## 2023-07-16 RX ADMIN — ACETAMINOPHEN 650 MG: 325 TABLET, FILM COATED ORAL at 13:50

## 2023-07-16 RX ADMIN — IBUPROFEN 800 MG: 800 TABLET, FILM COATED ORAL at 05:16

## 2023-07-16 ASSESSMENT — PAIN DESCRIPTION - PAIN TYPE: TYPE: ACUTE PAIN

## 2023-07-16 NOTE — CARE PLAN
The patient is Stable - Low risk of patient condition declining or worsening    Shift Goals  Clinical Goals: Blood pressure control  Patient Goals: Discharge today  Family Goals: No family present    Progress made toward(s) clinical / shift goals:    Problem: Knowledge Deficit - Standard  Goal: Patient and family/care givers will demonstrate understanding of plan of care, disease process/condition, diagnostic tests and medications  Outcome: Met     Problem: Pain - Standard  Goal: Alleviation of pain or a reduction in pain to the patient’s comfort goal  Outcome: Met       Patient is not progressing towards the following goals:

## 2023-07-16 NOTE — CARE PLAN
The patient is Stable - Low risk of patient condition declining or worsening    Shift Goals  Clinical Goals: Monitor blood pressure  Patient Goals: Sleep  Family Goals: NA    Progress made toward(s) clinical / shift goals:  Patient demonstrates understanding of POC, condition and medications.  Patient denies pain during shift.      Patient is not progressing towards the following goals:

## 2023-07-16 NOTE — PROGRESS NOTES
This RN gave telephone report and update on the patient to JIMMIE Servin at the [facility]. Per the receiving RN, Dr. Arroyo is to call the receiving MD upon discharge.

## 2023-07-16 NOTE — PROGRESS NOTES
Dr. Arroyo contacted Dr. Vale at the receiving facility prior to discharge of the [facility] the [facility]. Patient escorted with guards off stage via the emergency department for transfer back to the [facility]. IV's removed from the patient prior to discharge.

## 2023-07-16 NOTE — DISCHARGE SUMMARY
Discharge Summary    CHIEF COMPLAINT ON ADMISSION  Chief Complaint   Patient presents with    Chest Pain     Pt states he had a sudden onset of crushing and shooting chest pain that lasted approx 5 seconds. Pt states it is substernal, and has happened 2x times before followed by his defibrillator going off. Today the defibrillator did not go off.        Reason for Admission  EMS     Admission Date  7/14/2023    CODE STATUS  Full Code    HPI & HOSPITAL COURSE  Phil Browne is a 48 y.o. male who presented 7/14/2023 with a prisoner with a past medical history of chronic systolic heart failure with a EF of 40%, pacemaker, arrhythmia, hypertension, dyslipidemia presents with complaints of chest pain.  Apparently patient has normally chronic chest pain but the pain would last day has been different and worse.  He describes it as a sharp pain in the left side of his chest intensity 7 out of 10 intermittent with no radiation.  The pain did improve with nitroglycerin patient was noted to have markedly elevated blood pressure patient was transferred from the outlying facility because they were concerned that patient may have a STEMI.  Upon arrival the EKG shows no evidence of a STEMI.  Patient referred to me to rule for possible myocardial ischemia time: Patient's blood pressure    There is nausea but no vomiting    No shortness of breath    Patient was monitored on telemetry there is no evidence of arrhythmia.  Patient blood pressure was uncontrolled and we initially increase patient's lisinopril to 40 minutes p.o. daily but that caused patient's blood pressure to be too low so went back to 20 mg po daily.    Patient had echocardiogram that would be noted below.  There is no acute findings.  Patient had a Persantine stress that showed no evidence of reversible ischemia.  Patient was discharged back to detention to resume his home medications with no changes.    Patient should be checking his blood pressure at least once  daily to avoid a recurrence of this hypertensive crisis    Patient to have a blood test to check his Bmp duty next week to monitor his kidney function.  Patient's creatinine is slightly increased related to his n.p.o. status for testing, NSAID usage and increased doses of ACE inhibitor-all which were corrected before discharge back to prison    Therefore, he is discharged in good and stable condition to court or custody of law enforcement.    The patient recovered much more quickly than anticipated on admission.    Discharge Date  7/16    FOLLOW UP ITEMS POST DISCHARGE      DISCHARGE DIAGNOSES  Principal Problem:    Chest pain (POA: Yes)  Active Problems:    Mixed hyperlipidemia (POA: Yes)      Overview: Continue statin    Chronic systolic heart failure (HCC) (POA: Yes)    History of ventricular tachycardia (POA: Yes)    Prolonged Q-T interval on ECG (POA: Yes)  Resolved Problems:    Hypertensive urgency (POA: Yes)      FOLLOW UP  No future appointments.  No follow-up provider specified.    MEDICATIONS ON DISCHARGE     Medication List        CONTINUE taking these medications        Instructions   amiodarone 200 MG Tabs  Commonly known as: Cordarone   Take 200 mg by mouth 2 times a day.  Dose: 200 mg     aspirin 81 MG EC tablet   Take 81 mg by mouth every day.  Dose: 81 mg     atorvastatin 20 MG Tabs  Commonly known as: Lipitor   Take 20 mg by mouth at bedtime.  Dose: 20 mg     calcium polycarbophil 625 MG Tabs  Commonly known as: Fibercon   Take 1,250 mg by mouth every day.  Dose: 1,250 mg     carvedilol 6.25 MG Tabs  Commonly known as: Coreg   Take 6.25 mg by mouth 2 times a day.  Dose: 6.25 mg     Cholecalciferol 2000 UNIT Tabs   Take 2,000 Units by mouth every day.  Dose: 2,000 Units     Colloidal Oatmeal 2 % Crea   Apply 1 Application topically 1 time a day as needed (ECZEMA).  Dose: 1 Application     furosemide 20 MG Tabs  Commonly known as: Lasix   Take 20 mg by mouth 1 time a day as needed (EDEMA).  Dose: 20  mg     lactulose 10 GM/15ML Soln   Take 20 g by mouth 2 times a day.  Dose: 20 g     lisinopril 20 MG Tabs  Commonly known as: Prinivil   Take 20 mg by mouth every day.  Dose: 20 mg     mexiletine 150 MG Caps  Commonly known as: Mexitil   Take 150 mg by mouth 3 times a day.  Dose: 150 mg     Nasacort Allergy 24HR Children 55 MCG/ACT nasal inhaler  Generic drug: triamcinolone   Administer 2 Sprays into affected nostril(S) every day.  Dose: 2 Spray     nitroglycerin 0.4 MG Subl  Commonly known as: Nitrostat   Place 0.4 mg under the tongue every 5 minutes as needed for Chest Pain.  Dose: 0.4 mg     Non Formulary Request   Apply 1 Application topically every day. Mineral oil-Lanolin Lotion  Dose: 1 Application     sodium chloride 0.65 % nasal spray  Commonly known as: Ocean   Administer 2 Sprays into affected nostril(S) 1 time a day as needed (NASAL CONGESTION).  Dose: 2 Spray     spironolactone 25 MG Tabs  Commonly known as: Aldactone   Take 25 mg by mouth every day.  Dose: 25 mg     tamsulosin 0.4 MG capsule  Commonly known as: Flomax   Take 0.4 mg by mouth every evening.  Dose: 0.4 mg              Allergies  No Known Allergies    DIET  Orders Placed This Encounter   Procedures    Diet Order Diet: Cardiac; Tray Modifications (optional): No Sharps (Paperware)     Standing Status:   Standing     Number of Occurrences:   1     Order Specific Question:   Diet:     Answer:   Cardiac [6]     Order Specific Question:   Tray Modifications (optional)     Answer:   No Sharps (Paperware)       ACTIVITY  As tolerated.  Weight bearing as tolerated    CONSULTATIONS      PROCEDURES  Reading Provider Prelim 7/15/2023    West Vargas M.D.  [phone] 7/15/2023      Narrative & Impression                           Myocardial Perfusion   Report   NUCLEAR IMAGING INTERPRETATION   No reversible defects that would indicate ischemia.       Normal left ventricular size, ejection fraction, and wall motion.      EDLILAC, EIGHTEEN      MRN:     [id number]         Gender:    M      Exam Date: 07/15/2023 09:34      Exam Location:      [facility]      Ordering Phys:     KALANI GUILLERMO      [facility] Tech:       Ludivina Botello      Age:    48    :    1975        Ht (in):     73      Wt (lb):     191    BMI:    25.31       Radiologist      Risk Factors:             Family history of coronary disease, Smoking,                              Hypertension      Indications:      ICD Codes:      Cardiac History:          Positive risk factors, Syncope, Dyspnea, Abnormal                              resting ECG      Cardiac Meds:      Meds Past 24 hrs:      Pretest Chest Pain:       No symptoms      STRESS TEST      Pharmacologi                    c   Elio   Lexiscan       Dose: 0.4 mg   ol:                                 Post-Injection Exercise:        No exercise followed the intravenous   injection                     Resting HR (bpm):      60      Peak HR (bpm):         73      Resting BP (mmHg):       121    /   61      Peak BP (mmHg):       101   /   49      MaxPHR:     171     Target HR (bpm):       145      % MaxPHR:     43      Double Product:       7373      BP Response:      Stress Termination:       COMPLETED PROTOCOL      Stress Symptoms:   Chest Pressure,hypotensive Bp response,ST changes noted      ECG      Resting ECG:      Stress ECG:      IMAGE PROTOCOL      Rest/Stress 1                        Day              RadiopharmaceuticalDose (mCi)   Imaging  Date      Imaging  Time           Inj to Img Time (min)   Rest:   Tc-99m             7.19         15-Jul-2023        10:04           Tetrofosmin   Stress: Tc-99m             25.9         15-Jul-2023        10:54           Tetrofosmin      Rest:   Administration Site:       Right antecubital                               fossa   Administered by:      Ludivina Botello      Stress:   Administration Site:       Right antecubital                               fossa   Administered by:      Ludivina Botello      %  Percent HR Achieved:   SPECT RESULTS      Technical Quality:       Good      Raw Data Analysis:   Summed Stress Score:    1   Summed Rest Score:    4   Summed Difference Score:        0   PERFUSION:   No reversible defects that would indicate ischemia.       FUNCTIONAL RESULTS (calculated via Gated SPECT)      Stress Image LV EF:        60     %      Upper Normal Limit      Stress EDV:      180    ml   EDVI:    85      ml/m2      Stress ESV:      72     ml   ESVI:    34      ml/m2      TID:    1.04   TID - 1.19      TID (ed) - 1.23   LV Function:   Normal left ventricular wall motion.  LV ejection fraction = 60%.                  ===========================  This study doesn't have any protocol information  Images     Show images for EC-ECHOCARDIOGRAM COMPLETE W/O CONT  EC-ECHOCARDIOGRAM COMPLETE W/O CONT  Order: [id number]  Status: Final result     Visible to patient: No (inaccessible in MyChart)     Next appt: None     0 Result Notes  Details    Reading Physician Reading Date Result Priority   No Reading Provider Prelim 7/14/2023    Diego Stanley M.D.  [phone] 7/14/2023      Narrative & Impression  Transthoracic  Echo Report        Echocardiography Laboratory     CONCLUSIONS  No prior study is available for comparison.  Asymmetric posterior wall hypertrophy.  Moderately reduced left ventricular systolic function.   The left ventricular ejection fraction is estimated to be 40%.   Global hypokinesis.  Pacer/ICD wire seen in the right ventricle.  Estimated right ventricular systolic pressure is 25 mmHg.  Pericardial effusion located near the right ventricle without evidence   of hemodynamic compromise.     VY CISNEROS  Exam Date:         07/14/2023                      14:33  Exam Location:     Inpatient  Priority:          Routine     Ordering Physician:        KALANI GUILLERMO  Referring Physician:       [id number] Barriga  Sonographer:               Chrystal MIGUEL      Age:    48     Gender:    M  MRN:    [id number]  :    1975  BSA:    2.13   Ht (in):    73     Wt (lb):    196  Exam Type:     Complete     Indications:     Chest pain, unspecified  ICD Codes:       R07.9     CPT Codes:       38295     BP:   172    /   107    HR:   60  Technical Quality:       Fair     MEASUREMENTS  (Male / Female) Normal Values  2D ECHO  LV Diastolic Diameter PLAX        6 cm                  4.2 - 5.9 / 3.9 - 5.3   cm  LV Systolic Diameter PLAX         4.8 cm                2.1 - 4.0 cm  IVS Diastolic Thickness           0.82 cm                 LVPW Diastolic Thickness          1.2 cm                  LVOT Diameter                     2.7 cm                  Aortic Root Diameter              3.9 cm                  Estimated LV Ejection Fraction    40 %                    LV Ejection Fraction MOD BP       40.4 %                >= 55  %  LV Ejection Fraction MOD 4C       39.4 %                  LV Ejection Fraction MOD 2C       39.3 %                  LV Ejection Fraction 4C AL        42.4 %                  LV Ejection Fraction 2C AL        40.9 %                  LA Volume Index                   29.1 cm3/m2           16 - 28 cm3/m2     DOPPLER  AV Peak Velocity                  1 m/s                   AV Peak Gradient                  4.3 mmHg                AV Mean Gradient                  3 mmHg                  LVOT Peak Velocity                0.79 m/s                AV Area Cont Eq vti               4 cm2                   Mitral E Point Velocity           0.34 m/s                Mitral E to A Ratio               0.56                    MV Pressure Half Time             63 ms                   MV Area PHT                       3.5 cm2                 MV Deceleration Time              216 ms                  TR Peak Velocity                  213 cm/s                PV Peak Velocity                  0.74 m/s                PV Peak Gradient                  2.2 mmHg                RVOT  Peak Velocity                0.7 m/s                 LV E' Lateral Velocity            3.9 cm/s                Mitral E to LV E' Lateral Ratio   8.7                     LV E' Septal Velocity             3.8 cm/s                Mitral E to LV E' Septal Ratio    8.8                        * Indicates values subject to auto-interpretation  LV EF:  40    %     FINDINGS  Left Ventricle  Normal left ventricular chamber size. Asymmetric posterior wall   hypertrophy. Moderately reduced left ventricular systolic function. The   left ventricular ejection fraction is visually estimated to be 40%.   Global hypokinesis. Grade I diastolic dysfunction.     Right Ventricle  The right ventricle is normal in size and systolic function.  Pacer/ICD   wire seen in the right ventricle.     Right Atrium  The right atrium is normal in size. Normal inferior vena cava size and   inspiratory collapse.     Left Atrium  The left atrium is normal in size. Left atrial volume index is 28 mL/sq   m.     Mitral Valve  Structurally normal mitral valve without significant stenosis. Trace   mitral regurgitation.     Aortic Valve  Tricuspid aortic valve. No stenosis or regurgitation seen.     Tricuspid Valve  Structurally normal tricuspid valve without significant stenosis. Mild   tricuspid regurgitation. Estimated right ventricular systolic pressure   is 25 mmHg. Right atrial pressure is estimated to be 3 mmHg.     Pulmonic Valve  The pulmonic valve is not well visualized. No stenosis or regurgitation   seen.     Pericardium  Pericardial effusion located near the right ventricle without evidence   of hemodynamic compromise.     Aorta  Normal aortic root for body surface area. The ascending aorta diameter   is 3.6 cm.                                Diego Stanley MD  (Electronically Signed)  Final Date:     14 July 2023                   15:51         Component 2 d ago   Eject.Frac. MOD BP 40.44    Eject.Frac. MOD 4C 39.42    Eject.Frac. MOD 2C 39.29     Left Ventrical Ejection Fraction 40    Resulting Agency [facility]              Specimen Collected: 07/14/23  2:33 PM Last Resulted: 07/14/23  3:52 PM        Lab Flowsheet     Order Details     View Encounter     Lab and Collection Details     Routing     Result History - Result Edited     View All Conversations on this Encounter           Linked Documents     View SynapseCV Report      Result Care Coordination      Patient Communication     Add Comments   Not seen Back to Top           EC-ECHOCARDIOGRAM COMPLETE W/O CONT [[id number]]    Electronically signed by: Anderson Arroyo M.D. on 07/14/23 1338 Status: Completed   Ordering user: Anderson Arroyo M.D. 07/14/23 1338               LABORATORY  Lab Results   Component Value Date    SODIUM 137 07/16/2023    POTASSIUM 4.2 07/16/2023    CHLORIDE 104 07/16/2023    CO2 25 07/16/2023    GLUCOSE 87 07/16/2023    BUN 23 (H) 07/16/2023    CREATININE 1.61 (H) 07/16/2023        Lab Results   Component Value Date    WBC 5.5 07/14/2023    HEMOGLOBIN 13.3 (L) 07/14/2023    HEMATOCRIT 39.1 (L) 07/14/2023    PLATELETCT 106 (L) 07/14/2023        Total time of the discharge process exceeds 40 minutes.

## 2023-07-16 NOTE — PROGRESS NOTES
Hospital Medicine Daily Progress Note    Date of Service  7/15/2023    Chief Complaint  Phil Browne is a 48 y.o. male admitted 7/14/2023 with chest pain    Hospital Course  No notes on file    Interval Problem Update  Patient underwent a Persantine thallium stress test today did not show any evidence of acute ischemia.    Patient complaining of significant weakness      Noted patient blood pressure to be low.  Chart review shows that patient's ejection fraction is 40% thus saline bolus or IV fluid resuscitation was not given.    We decreased patient's lisinopril from 40 mg p.o. daily to 20 mg p.o. daily    Due to patient's significant weakness and fatigue that I believe is related to his low blood pressure patient will need to be monitored for further titration of his antihypertensive medications        I have discussed this patient's plan of care and discharge plan at IDT rounds today with Case Management, Nursing, Nursing leadership, and other members of the IDT team.    Consultants/Specialty      Code Status  Full Code    Disposition  The patient is medically cleared for discharge to home or a post-acute facility.  Anticipate discharge to: home with close outpatient follow-up    I have placed the appropriate orders for post-discharge needs.    Review of Systems  Review of Systems   Constitutional:  Positive for malaise/fatigue.   Eyes: Negative.    Respiratory:  Negative for shortness of breath.    Cardiovascular:  Negative for leg swelling.   Gastrointestinal:  Negative for abdominal pain, nausea and vomiting.   Genitourinary: Negative.    Musculoskeletal: Negative.    Neurological:  Positive for weakness. Negative for seizures.   Psychiatric/Behavioral: Negative.     All other systems reviewed and are negative.       Physical Exam  Temp:  [36.2 °C (97.2 °F)-36.9 °C (98.4 °F)] 36.9 °C (98.4 °F)  Pulse:  [60-62] 62  Resp:  [16-20] 16  BP: ()/() 121/80  SpO2:  [93 %-97 %] 97 %    Physical  Exam  Constitutional:       General: He is not in acute distress.     Appearance: He is ill-appearing. He is not diaphoretic.   HENT:      Head: Normocephalic.   Eyes:      Extraocular Movements: Extraocular movements intact.      Pupils: Pupils are equal, round, and reactive to light.   Cardiovascular:      Rate and Rhythm: Normal rate and regular rhythm.      Heart sounds: No murmur heard.     No gallop.   Pulmonary:      Effort: Pulmonary effort is normal. No respiratory distress.      Breath sounds: No stridor. No wheezing or rhonchi.   Abdominal:      General: There is no distension.      Palpations: There is no mass.      Tenderness: There is no abdominal tenderness. There is no guarding.      Hernia: No hernia is present.   Musculoskeletal:         General: Normal range of motion.      Cervical back: Normal range of motion. No rigidity.      Right lower leg: No edema.      Left lower leg: No edema.   Skin:     General: Skin is warm.      Capillary Refill: Capillary refill takes 2 to 3 seconds.      Coloration: Skin is not jaundiced or pale.   Neurological:      Mental Status: He is oriented to person, place, and time.      Cranial Nerves: No cranial nerve deficit.      Sensory: No sensory deficit.      Motor: No weakness.      Coordination: Coordination normal.   Psychiatric:         Mood and Affect: Mood normal.         Fluids  No intake or output data in the 24 hours ending 07/15/23 2220    Laboratory  Recent Labs     07/14/23  1030   WBC 5.5   RBC 4.23*   HEMOGLOBIN 13.3*   HEMATOCRIT 39.1*   MCV 92.4   MCH 31.4   MCHC 34.0   RDW 43.8   PLATELETCT 106*   MPV 11.3     Recent Labs     07/14/23  1030 07/15/23  0213   SODIUM 137 136   POTASSIUM 3.7 4.2   CHLORIDE 105 103   CO2 26 25   GLUCOSE 104* 123*   BUN 14 17   CREATININE 1.01 1.26   CALCIUM 7.6* 8.2*                   Imaging  NM-CARDIAC STRESS TEST   Final Result      EC-ECHOCARDIOGRAM COMPLETE W/O CONT   Final Result      DX-CHEST-PORTABLE (1 VIEW)    Final Result         1. No acute cardiopulmonary abnormalities are identified.           Assessment/Plan  * Chest pain- (present on admission)  Assessment & Plan  The ASCVD Risk score (Rukhsana DK, et al., 2019) failed to calculate for the following reasons:    Cannot find a previous HDL lab    Cannot find a previous total cholesterol lab    Unable to determine if patient is Non-      Chest pain is most likely related to uncontrolled hypertension    Negative Persantine thallium stress test.      Hypertensive urgency- (present on admission)  Assessment & Plan  Lisinopril changed back to 20 minutes p.o. daily due to patient's hypotension on 7/15/2023    Hydralazine IV as needed for SBP greater than 155 systolic    Prolonged Q-T interval on ECG- (present on admission)  Assessment & Plan  Avoid QT prolonging drugs    History of ventricular tachycardia- (present on admission)  Assessment & Plan  Continue amiodarone and Mexitil    Monitor on telemetry    Chronic systolic heart failure (HCC)- (present on admission)  Assessment & Plan  Continue monitor I's and O's    Daily weights    Continue ACE inhibitor and beta-blocker    Continue Aldactone for diuresis    Follow BnP and clinical volume status to see whether or not we need to initiate IV Lasix    Mixed hyperlipidemia- (present on admission)  Assessment & Plan  Continue atorvastatin         VTE prophylaxis: SCDs/TEDs    I have performed a physical exam and reviewed and updated ROS and Plan today (7/15/2023). In review of yesterday's note (7/14/2023), there are no changes except as documented above.    I spent 55 minutes, reviewing the chart, obtaining and/or reviewing separately obtained history. Performing a medically appropriate examination and evaluation.  Counseling and educating the patient. Ordering and reviewing medications, tests, or procedures.   Documenting clinical information in EPIC. Independently interpreting results and  communicating results to patient. Discussing plan of care and future disposition of care with patient, RN and case management.